# Patient Record
(demographics unavailable — no encounter records)

---

## 2025-04-10 NOTE — HISTORY OF PRESENT ILLNESS
[de-identified] : 15-year-old male presents with mother for evaluation of bilateral knee pain x 1-2 years. Denies trauma or injury. He complains of intermittent pain at the anterior and posterior aspects of the knees worse with walking and running. He has tried heat for the pain with some relief. No prior injuries.

## 2025-04-10 NOTE — DISCUSSION/SUMMARY
[de-identified] : The patient has bilateral knee pain.  He has quadriceps weakness.  I recommend a course of physical therapy and gait training.  The patient is concerned about his valgus alignment and wants to know if something can be done.  His growth lines have closed at both knees.  The only procedure that might work on straightening his leg would be surgical osteotomy and realignment.  I have not done full-length x-rays to evaluate his alignment but his mother does not think he should have any surgery at this point.  I recommend that he pursue a physical therapy program and return in 2 months.

## 2025-04-10 NOTE — PHYSICAL EXAM
[LE] : Sensory: Intact in bilateral lower extremities [DP] : dorsalis pedis 2+ and symmetric bilaterally [PT] : posterior tibial 2+ and symmetric bilaterally [Normal] : Alert and in no acute distress [Poor Appearance] : well-appearing [Acute Distress] : not in acute distress [Obese] : not obese [de-identified] : The patient has no respiratory distress. Mood and affect are normal. The patient is alert and oriented to person, place and time. There is no pain with active or passive motion of the hips.  There is no tenderness of either hip.  Examination of the knees demonstrates slight valgus alignment bilaterally.  There is posterior tenderness.  Quadriceps and hamstring function are intact.  There is no instability of collateral or cruciate ligaments.  He has quadriceps weakness bilaterally.  Calves are soft and nontender.  The skin is intact.  There is no lymphedema. [de-identified] : AP, lateral, tunnel and sunrise x-rays of both knees taken today demonstrate no fracture, no dislocation and no bony abnormality.  He has evidence of valgus alignment although these are not full-length films.